# Patient Record
Sex: MALE | Race: WHITE | URBAN - METROPOLITAN AREA
[De-identification: names, ages, dates, MRNs, and addresses within clinical notes are randomized per-mention and may not be internally consistent; named-entity substitution may affect disease eponyms.]

---

## 2019-03-20 ENCOUNTER — OFFICE VISIT (OUTPATIENT)
Dept: DERMATOLOGY | Facility: AMBULATORY SURGERY CENTER | Age: 64
End: 2019-03-20

## 2019-03-20 VITALS
HEIGHT: 68 IN | DIASTOLIC BLOOD PRESSURE: 80 MMHG | OXYGEN SATURATION: 98 % | WEIGHT: 180 LBS | SYSTOLIC BLOOD PRESSURE: 136 MMHG | TEMPERATURE: 98 F | HEART RATE: 65 BPM | RESPIRATION RATE: 15 BRPM | BODY MASS INDEX: 27.28 KG/M2

## 2019-03-20 DIAGNOSIS — C44.311 BASAL CELL CARCINOMA (BCC) OF LATERAL SIDE WALL OF NOSE: Primary | ICD-10-CM

## 2019-03-20 RX ORDER — OXYCODONE AND ACETAMINOPHEN 5; 325 MG/1; MG/1
1 TABLET ORAL
Qty: 20 TAB | Refills: 0 | Status: SHIPPED | OUTPATIENT
Start: 2019-03-20 | End: 2019-03-23

## 2019-03-20 RX ORDER — CEPHALEXIN 500 MG/1
500 CAPSULE ORAL 3 TIMES DAILY
Qty: 21 CAP | Refills: 0 | Status: SHIPPED | OUTPATIENT
Start: 2019-03-20 | End: 2019-03-27

## 2019-03-20 NOTE — PROGRESS NOTES
Teddy Mcguire is a 59 y.o. male who is seen post-Mohs surgery to evaluate:  Chief Complaint:  Wound resulting from surgical extirpation of a skin cancer. HPI: Biopsy-proven basal cell carcinoma of the right nasal sidewall, right medial cheek and right lower eyelid s/p 1 stages of Mohs surgery resulting in tumor-free margins. The size of the defect is 0.0 x 1.5 cm. The site is bleeding and is not painful. The tumor was removed on 3/20/2019. Outpatient Encounter Medications as of 3/20/2019   Medication Sig Dispense Refill    oxyCODONE-acetaminophen (PERCOCET) 5-325 mg per tablet Take 1 Tab by mouth every four (4) hours as needed for Pain for up to 3 days. Max Daily Amount: 6 Tabs. 20 Tab 0    cephALEXin (KEFLEX) 500 mg capsule Take 1 Cap by mouth three (3) times daily for 7 days. 21 Cap 0     No facility-administered encounter medications on file as of 3/20/2019. No Known Allergies    Social History     Tobacco Use   Smoking Status Never Smoker   Smokeless Tobacco Never Used       Physical Exam   Constitutional: He is oriented to person, place, and time and well-developed, well-nourished, and in no distress. HENT:   Head: Normocephalic. Eyes: EOM are normal.   Pulmonary/Chest: Effort normal.   Lymphadenopathy:        Head (right side): No submental, no submandibular, no preauricular and no posterior auricular adenopathy present. Head (left side): No submental, no submandibular, no preauricular and no posterior auricular adenopathy present. Right cervical: No superficial cervical adenopathy present. Left cervical: No superficial cervical adenopathy present. Neurological: He is alert and oriented to person, place, and time. Skin:   There is a 2 cm surgical defect involving the right nasal sidewall, right medial cheek and right lower lid. The patient has very sebaceous nasal skin that extends down the sidewall and onto the medial cheek.   Moderate amount of laxity exists on the inferior cheek and glabella. Evaluation and Management:  There are symptomatic surgical defects as noted above. Treatment options discussed with the patient include second intent healing, primary closure, adjacent tissue transfer (flap), and skin graft (full or split thickness) without cartilage grafting. Following evaluation of the defect(s) and discussion with the patient regarding risks and benefits of the various options, the plan to repair the defect with a(n) rotation flap was chosen. This repair was chosen in order to avoid excessive wound tension, preserve anatomic form or function and avoid free margin distortion. The wound management options of second intent healing, layered closure, local flap, or full thickness skin graft were discussed. Mr. Migel Gao understands the aims, risks, alternatives, and possible complications and elects to proceed with paired rotation flaps in an O-Z configuration. Mr. Migel Gao was placed in a supine position on the operating table in the Mohs surgery procedure room. The area was prepped and draped in the standard manner. Gentian violet was used to outline the proposed flap.  1% lidocaine with epinephrine 1:100,000 was used to supplement the already existing anesthesia. The wound penetrated to the subcutaneous fat layer and measured 2.0 x 1.5 cm. The wound margins were debeveled and the flap was incised using a #15 blade held perpendicularly to the skin surface. The flap and defect margins were widely undermined in the subcutaneous plane. Hemostasis was obtained with spot electrocoagulation. The flap was rotated into place. 5-0 monocryl and 4-0 vicryl buried vertical mattress sutures were used to approximate the deep tissues and dermis. 5-0 prolene epidermal sutures were used to approximate the skin edges with careful attention to apposition and eversion. A combination of flaps measured 8.5 x 5.5 cm.     A pressure dressing utilizing Petrolatum ointment, Telfa, gauze and Coverroll was placed. Wound care instructions (written and/or verbal) and a follow up appointment were given to the patient before discharge. Mr. Anita Lorenzo was discharged in good condition. Follow-up and Dispositions    · Return in about 1 week (around 3/27/2019) for Suture removal.         Marcio Trammell MD    [address]   OFFICE PROCEDURE PROGRESS NOTE   Chart reviewed for the following:   Mauricio López MD have reviewed the History, Physical and updated the Allergic reactions for Carolyne Columbus. TIME OUT performed immediately prior to start of procedure:   Mauricio López MD, have performed the following reviews on Carolyne Columbus   prior to the start of the procedure:     * Patient was identified by name and date of birth   * Agreement on procedure being performed was verified   * Risks and Benefits explained to the patient   * Procedure site verified and marked as necessary   * Patient was positioned for comfort   * Consent was signed and verified     Time: 9 AM  Date of procedure: 3/20/2019  Procedure performed by:  Amaya May MD  Provider assisted by: Sandrita Self LPN and Cristina Gillespie LPN  Patient assisted by: self   How tolerated by patient: tolerated the procedure well with no complications   Comments: none

## 2019-03-20 NOTE — PATIENT INSTRUCTIONS
WOUND CARE INSTRUCTIONS    1. Keep the dressing clean and dry and do not remove for 48 hours. 2. Then change the dressing once a day as follows:  a. Wash hands before and after each dressing change. b. Remove dressing and wash site gently with mild soap and water, rinse, and pat dry.  c. Apply an ointment (Bacitracin, Polysporin, Neosporin, Petroleum jelly or Aquaphor). d. Apply a non-stick (Telfa) dressing or Band-Aid to cover the wound. 3. Watch for:  BLEEDING: A small amount of drainage may occur. If bleeding occurs, elevate and rest the surgery site. Apply gauze and steady pressure for 15 minutes. If bleeding continues, call this office. INFECTION: Signs of infection include increased redness, pain, warmth, drainage of pus, and fever. If this occurs, call this office. 4. Special Instructions (follow any that are checked):  · [x] You have stitches that DO need to be removed. · [x] Avoid bending at the waist and heavy lifting for two days. · [x] Sleep with your head elevated for the next two nights. · [x] Rest the surgery site and keep it elevated as much as possible for two days. · [x] You may apply an ice-pack for 10-15 minutes every waking hour for the rest of the day. · [] Eat a soft diet and avoid hot food and hot drinks for the rest of the day. · [] Other instructions: Follow up as directed. Take Tylenol or Ibuprofen for pain as needed. Once the site is healed with no remaining bandages or open areas, protect your surgical site and scar from the sun, as this area will be more sensitive. Use a broad spectrum sunscreen SPF 30 or higher daily, and a chemical free product (one containing zinc oxide or titanium dioxide) is a good choice if the area is sensitive. You may begin to gently massage the surgical site in 2-3 weeks, rubbing in a circular motion along the scar. This can help reduce swelling and thickness of a scar.  A scar cream may be used beginnning 1 month after the surgery. If you have any questions or concerns, please call our office Monday through Friday at [phone]. You can also contact Dr. No Park directly for emergency purposes at [phone].

## 2019-03-27 ENCOUNTER — OFFICE VISIT (OUTPATIENT)
Dept: DERMATOLOGY | Facility: AMBULATORY SURGERY CENTER | Age: 64
End: 2019-03-27

## 2019-03-27 VITALS
DIASTOLIC BLOOD PRESSURE: 70 MMHG | TEMPERATURE: 98.7 F | BODY MASS INDEX: 27.28 KG/M2 | SYSTOLIC BLOOD PRESSURE: 116 MMHG | HEART RATE: 81 BPM | WEIGHT: 180 LBS | OXYGEN SATURATION: 98 % | HEIGHT: 68 IN

## 2019-03-27 DIAGNOSIS — C44.311 BASAL CELL CARCINOMA (BCC) OF LATERAL SIDE WALL OF NOSE: Primary | ICD-10-CM

## 2019-03-27 NOTE — PROGRESS NOTES
Wound check/suture removal:    Chief complaint: wound check. HPI: Zack Farmer presents for wound check following Mohs surgery to treat biopsy-proven basal cell carcinoma of the right medial canthus performed about 1 week ago. Patient has not had any problems with the surgical site such as pain or bleeding. He has been applying liberal amounts of Vaseline. Exam: The surgical site was examined. There is not evidence of infection. There is erythema. There is not edema. There is partial thickness necrosis of the distal tip of the cheek flap. The glabellar flap is completely viable. There is no ectropion. It    A/P:  Wound check and sutures removed today. The surgical site is healing relatively well, although the inferior flap from the cheek has undergone partial necrosis of the distal tip. Additional care was reviewed including liberal application of Vaseline several times daily. I counseled the patient to avoid picking at the portion of the wound is continuing to heal.  Follow up will be in 2 weeks to evaluate the progression of wound healing.

## 2019-04-09 ENCOUNTER — OFFICE VISIT (OUTPATIENT)
Dept: DERMATOLOGY | Facility: AMBULATORY SURGERY CENTER | Age: 64
End: 2019-04-09

## 2019-04-09 VITALS
SYSTOLIC BLOOD PRESSURE: 112 MMHG | BODY MASS INDEX: 27.28 KG/M2 | OXYGEN SATURATION: 98 % | RESPIRATION RATE: 14 BRPM | HEIGHT: 68 IN | TEMPERATURE: 98.5 F | WEIGHT: 180 LBS | DIASTOLIC BLOOD PRESSURE: 78 MMHG | HEART RATE: 67 BPM

## 2019-04-09 DIAGNOSIS — C44.311 BASAL CELL CARCINOMA (BCC) OF LATERAL SIDE WALL OF NOSE: Primary | ICD-10-CM

## 2019-04-09 NOTE — PROGRESS NOTES
Wound check/suture removal:    Chief complaint: wound check. HPI: Sailaja Trotter presents for wound check following Mohs surgery for biopsy-proven basal cell carcinoma of the right medial canthus repaired with opposing rotation flaps about 3 weeks ago. At suture removal there was necrosis of the tip of the inferior flap on the nasal sidewall. The patient has had no problems with the surgical site since that time and reports improved healing since then. Exam: The surgical site was examined. There is not evidence of infection. There is erythema. There is not edema. Nasal symmetry is preserved. The tip of the inferiorly based rotation flap from the cheek onto the nasal sidewall is continuing to heal as expected. A/P:  Wound check. The surgical site is healing well. Additional care was reviewed including liberal application of Vaseline to the healing portion of the flap several times daily. We also discussed scar massage 3-5 times daily for the incision lines on the nasal bridge, glabella and right medial cheek. Follow up will be in 1 month.

## 2019-05-09 ENCOUNTER — OFFICE VISIT (OUTPATIENT)
Dept: DERMATOLOGY | Facility: AMBULATORY SURGERY CENTER | Age: 64
End: 2019-05-09

## 2019-05-09 VITALS
HEIGHT: 68 IN | RESPIRATION RATE: 15 BRPM | BODY MASS INDEX: 27.28 KG/M2 | SYSTOLIC BLOOD PRESSURE: 116 MMHG | OXYGEN SATURATION: 97 % | WEIGHT: 180 LBS | TEMPERATURE: 98.1 F | DIASTOLIC BLOOD PRESSURE: 76 MMHG | HEART RATE: 61 BPM

## 2019-05-09 DIAGNOSIS — C44.311 BASAL CELL CARCINOMA (BCC) OF LATERAL SIDE WALL OF NOSE: Primary | ICD-10-CM

## 2019-05-09 NOTE — PROGRESS NOTES
Wound check/suture removal:    Chief complaint: wound check. HPI: Maura Contreras presents for wound check following Mohs surgery to treat a biopsy-proven basal cell carcinoma of the right medial canthus repaired with bilateral rotation flaps performed about 6 weeks ago. Since that time the patient has had no problems with the surgical site. The small area on the tip of the nasal portion of the flap has completed granulation. He does report that he had a small pimple near the medial canthus that was somewhat tender but has resolved. Exam: The surgical site was examined. There is not evidence of infection. There is not erythema. There is not edema. The suture lines on the lateral nose and glabella are moderately inverted. There is a slight web of the medial canthus    A/P:  Wound check. The surgical site is healing well. Additional care was reviewed including gentle scar massage starting at 3 weeks postop. Follow up will be in 2 months. We discussed dermabrasion and Kenalog injection, however the patient would like to defer that at this time.